# Patient Record
Sex: FEMALE | Race: WHITE | Employment: UNEMPLOYED | URBAN - METROPOLITAN AREA
[De-identification: names, ages, dates, MRNs, and addresses within clinical notes are randomized per-mention and may not be internally consistent; named-entity substitution may affect disease eponyms.]

---

## 2017-09-05 PROBLEM — H26.9 LEFT CATARACT: Status: ACTIVE | Noted: 2017-09-05

## 2022-07-22 ENCOUNTER — HOSPITAL ENCOUNTER (OUTPATIENT)
Dept: MAMMOGRAPHY | Age: 72
Discharge: HOME | End: 2022-07-24
Payer: MEDICARE

## 2022-07-22 DIAGNOSIS — M81.0 AGE-RELATED OSTEOPOROSIS WITHOUT CURRENT PATHOLOGICAL FRACTURE: ICD-10-CM

## 2022-07-22 PROCEDURE — 77080 DXA BONE DENSITY AXIAL: CPT

## 2022-10-21 ENCOUNTER — TELEPHONE (OUTPATIENT)
Dept: ENT CLINIC | Age: 72
End: 2022-10-21

## 2022-10-21 NOTE — TELEPHONE ENCOUNTER
Contacted pt for ENT Referral received, pt is scheduled with Olamide Guerrero NP 11/17/22. Pt notified of address: [address] [place] [address] [address], [facility], [address], given phone number: [phone] opt. 2 for clinical staff. Notified pt to please arrive 15 mins prior to appt (30 mins prior if ear/ hearing related), bring photo ID, Insurance card, and current list of medications with dosage information. Pt scheduled, verified understanding.

## 2024-03-14 ENCOUNTER — PROCEDURE VISIT (OUTPATIENT)
Dept: AUDIOLOGY | Age: 74
End: 2024-03-14
Payer: MEDICARE

## 2024-03-14 ENCOUNTER — OFFICE VISIT (OUTPATIENT)
Dept: ENT CLINIC | Age: 74
End: 2024-03-14
Payer: MEDICARE

## 2024-03-14 VITALS
HEART RATE: 100 BPM | DIASTOLIC BLOOD PRESSURE: 81 MMHG | BODY MASS INDEX: 28.02 KG/M2 | WEIGHT: 153.2 LBS | SYSTOLIC BLOOD PRESSURE: 108 MMHG

## 2024-03-14 DIAGNOSIS — H90.3 SENSORINEURAL HEARING LOSS (SNHL) OF BOTH EARS: Primary | ICD-10-CM

## 2024-03-14 PROCEDURE — 92567 TYMPANOMETRY: CPT | Performed by: AUDIOLOGIST

## 2024-03-14 PROCEDURE — 1123F ACP DISCUSS/DSCN MKR DOCD: CPT | Performed by: NURSE PRACTITIONER

## 2024-03-14 PROCEDURE — 99203 OFFICE O/P NEW LOW 30 MIN: CPT | Performed by: NURSE PRACTITIONER

## 2024-03-14 PROCEDURE — 92557 COMPREHENSIVE HEARING TEST: CPT | Performed by: AUDIOLOGIST

## 2024-03-14 RX ORDER — PANTOPRAZOLE SODIUM 40 MG/10ML
INJECTION, POWDER, LYOPHILIZED, FOR SOLUTION INTRAVENOUS
COMMUNITY
Start: 2023-09-04

## 2024-03-14 ASSESSMENT — ENCOUNTER SYMPTOMS
RHINORRHEA: 0
SHORTNESS OF BREATH: 0
EYES NEGATIVE: 1
SINUS PAIN: 0
RESPIRATORY NEGATIVE: 1
SINUS PRESSURE: 0
STRIDOR: 0

## 2024-03-14 NOTE — PROGRESS NOTES
[facility]  NEGRO SantiagoO. Ms.Ed.  New Consult       Patient Name:  Clemencia Rothman  :  1950     CHIEF C/O:    Chief Complaint   Patient presents with    New Patient     Pt states she hasn't had a hearing exam in a long time so wanted to get checked. States she is always asking  to turn the TV up        HISTORY OBTAINED FROM:  patient    HISTORY OF PRESENT ILLNESS:       Clemencia is a 73 y.o. year old female, here today for:       Hearing loss.  Patient states that for the past 1 year she has had a noticeable decline in her hearing.  She states that is most noticeable when watching television and often asked her  to increase the volume so that she could hear better.  She denies any difficulty with conversations or hearing her surroundings.  She denies any ringing in her years.  She denies any previous diagnosis of hearing loss.  She does have a family history of hearing loss in her mother.  She denies any persistent noise exposure history.  She denies any history of recurrent ear infections or previous ear surgeries.  She denies any dizziness.  She denies any ear pain or pressure.           Past Medical History:   Diagnosis Date    Hiatal hernia     Hyperlipidemia      Past Surgical History:   Procedure Laterality Date    CATARACT REMOVAL WITH IMPLANT Left 2017       Current Outpatient Medications:     pantoprazole (PROTONIX) 40 MG injection, , Disp: , Rfl:     atorvastatin (LIPITOR) 10 MG tablet, Take 1 tablet by mouth daily, Disp: , Rfl:     naproxen (NAPROSYN) 500 MG tablet, Take 1 tablet by mouth 2 times daily (with meals) for 7 days, Disp: 14 tablet, Rfl: 0    omeprazole (PRILOSEC) 20 MG capsule, Take 20 mg by mouth nightly , Disp: , Rfl:   Patient has no known allergies.  Social History     Tobacco Use    Smoking status: Never    Smokeless tobacco: Never   Substance Use Topics    Alcohol use: No    Drug use: No     History reviewed. No pertinent family

## 2024-03-18 NOTE — PROGRESS NOTES
This patient was referred for audiometric and tympanometric testing by BEE Choudhary due to hearing loss.     Audiometry using pure tone air and bone conduction testing revealed an essentially mild sloping to moderate sensorineural hearing loss, bilaterally. Reliability was good. Speech reception thresholds were in good agreement with the pure tone averages, bilaterally. Speech discrimination scores were excellent, bilaterally.    Tympanometry revealed normal middle ear peak pressure and compliance, bilaterally.    The results were reviewed with the patient.     Recommendations for follow up will be made pending physician consult.      Zachery Rubin CCC-A  [facility] [id number]   Electronically signed by Zachery Rubin on 3/18/2024 at 9:43 AM

## 2024-04-02 ENCOUNTER — TELEPHONE (OUTPATIENT)
Dept: AUDIOLOGY | Age: 74
End: 2024-04-02

## 2024-04-02 NOTE — TELEPHONE ENCOUNTER
Patient called to inform dept that she received letter that stated:  [facility] is not a participating provider, at the [facility] [place]  Information forwarded to  credentialing.    Jose Concepcion CCC/NAKIA  Audiologist  [id number]  NPI#:  [id number]